# Patient Record
Sex: MALE | Race: WHITE | NOT HISPANIC OR LATINO | URBAN - METROPOLITAN AREA
[De-identification: names, ages, dates, MRNs, and addresses within clinical notes are randomized per-mention and may not be internally consistent; named-entity substitution may affect disease eponyms.]

---

## 2017-02-14 ENCOUNTER — NEW PATIENT (OUTPATIENT)
Dept: URBAN - METROPOLITAN AREA CLINIC 85 | Facility: CLINIC | Age: 77
End: 2017-02-14
Payer: MEDICARE

## 2017-02-14 DIAGNOSIS — T85.22XA DISPLACEMENT OF INTRAOCULAR LENS, INITIAL ENCOUNTER: Primary | ICD-10-CM

## 2017-02-14 DIAGNOSIS — H40.1113 PRIMARY OPEN-ANGLE GLAUCOMA, SEVERE STAGE, RIGHT EYE: ICD-10-CM

## 2017-02-14 PROCEDURE — 76514 ECHO EXAM OF EYE THICKNESS: CPT | Performed by: OPHTHALMOLOGY

## 2017-02-14 PROCEDURE — 92004 COMPRE OPH EXAM NEW PT 1/>: CPT | Performed by: OPHTHALMOLOGY

## 2017-02-14 ASSESSMENT — INTRAOCULAR PRESSURE
OD: 4
OS: 20

## 2017-02-14 ASSESSMENT — VISUAL ACUITY: OS: 20/25

## 2020-09-30 ENCOUNTER — RECORDS - HEALTHEAST (OUTPATIENT)
Dept: ADMINISTRATIVE | Facility: OTHER | Age: 80
End: 2020-09-30

## 2020-09-30 ENCOUNTER — RECORDS - HEALTHEAST (OUTPATIENT)
Dept: LAB | Facility: HOSPITAL | Age: 80
End: 2020-09-30

## 2020-09-30 ENCOUNTER — OFFICE VISIT (OUTPATIENT)
Dept: NEUROLOGY | Facility: CLINIC | Age: 80
End: 2020-09-30
Payer: COMMERCIAL

## 2020-09-30 VITALS
WEIGHT: 142 LBS | HEART RATE: 57 BPM | DIASTOLIC BLOOD PRESSURE: 95 MMHG | HEIGHT: 69 IN | BODY MASS INDEX: 21.03 KG/M2 | SYSTOLIC BLOOD PRESSURE: 145 MMHG

## 2020-09-30 DIAGNOSIS — R41.89 COGNITIVE DECLINE: Primary | ICD-10-CM

## 2020-09-30 DIAGNOSIS — G31.84 MILD COGNITIVE IMPAIRMENT, SO STATED: ICD-10-CM

## 2020-09-30 LAB
TSH SERPL DL<=0.005 MIU/L-ACNC: 0.91 UIU/ML (ref 0.3–5)
VIT B12 SERPL-MCNC: 430 PG/ML (ref 213–816)

## 2020-09-30 PROCEDURE — 99205 OFFICE O/P NEW HI 60 MIN: CPT | Performed by: PSYCHIATRY & NEUROLOGY

## 2020-09-30 RX ORDER — DILTIAZEM HYDROCHLORIDE 120 MG/1
CAPSULE, EXTENDED RELEASE ORAL
COMMUNITY
Start: 2020-06-24

## 2020-09-30 RX ORDER — DORZOLAMIDE HYDROCHLORIDE AND TIMOLOL MALEATE 20; 5 MG/ML; MG/ML
SOLUTION/ DROPS OPHTHALMIC
COMMUNITY
Start: 2020-08-21

## 2020-09-30 RX ORDER — DILTIAZEM HYDROCHLORIDE 120 MG/1
1 CAPSULE, COATED, EXTENDED RELEASE ORAL EVERY 24 HOURS
COMMUNITY
Start: 2020-02-05

## 2020-09-30 RX ORDER — PRAVASTATIN SODIUM 20 MG
20 TABLET ORAL
COMMUNITY
Start: 2020-09-22

## 2020-09-30 ASSESSMENT — MONTREAL COGNITIVE ASSESSMENT (MOCA)
7. [VIGILENCE] TAP WHEN HEARING DESIGNATED LETTER: 1
WHAT LEVEL OF EDUCATION WAS ATTAINED: 1
13. ORIENTATION SUBSCORE: 5
9. REPEAT EACH SENTENCE: 2
11. FOR EACH PAIR OF WORDS, WHAT CATEGORY DO THEY BELONG TO (OUT OF 2): 2
8. SERIAL SUBTRACTION OF 7S: 0
4. NAME EACH OF THE THREE ANIMALS SHOWN: 3
VISUOSPATIAL/EXECUTIVE SUBSCORE: 5
10. [FLUENCY] NAME WORDS STARTING WITH DESIGNATED LETTER: 0
6. READ LIST OF DIGITS [FORWARD/BACKWARD]: 1
12. MEMORY INDEX SCORE: 2
WHAT IS THE TOTAL SCORE (OUT OF 30): 22

## 2020-09-30 ASSESSMENT — MIFFLIN-ST. JEOR: SCORE: 1344.49

## 2020-09-30 NOTE — NURSING NOTE
Chief Complaint   Patient presents with     Dementia     America Briones CMA on 9/30/2020 at 11:21 AM

## 2020-09-30 NOTE — PROGRESS NOTES
NEUROLOGY OUTPATIENT CONSULT NOTE  Sep 30, 2020     CHIEF COMPLAINT/REASON FOR VISIT/REASON FOR CONSULT  Patient presents with:  Dementia    REASON FOR CONSULTATION- Memory concerns    REFERRAL SOURCE  Dr. Carlota Chino  CC Dr. Carlota Chino    HISTORY OF PRESENT ILLNESS  Redd Mcpherson is a 80 year old male seen today for for memory problems.  Patient is accompanied by his wife.  Patient himself does not think he has any memory issues though the wife has been noticing more issues over the last few years.  Symptoms have been progressive.  Symptoms are mainly present once in a while and not every day.  He does not do too many activities during the daytime. He is interested in baseball.  Works on his old car.  Sometimes works in the yard.  Does not like to do chores inside the home.    The wife reports that occasionally he will have difficulty finding words many strength constructive symptoms.  Sometimes he would make up words of his own.  He does not really get lost in the neighborhood though sometimes will get lost in unfamiliar places.  Denies any issues with math.  No difficulty with remembering things do every day parents though he does not do much to start with.    No hallucinations.  No new medications.  Denies depression anxiety or any mood issues.    Previous history is reviewed and this is unchanged.    PAST MEDICAL/SURGICAL HISTORY  Past Medical History:   Diagnosis Date     Hypertension      There is no problem list on file for this patient.  Significant for atrial fibrillation for which he is on Eliquis    FAMILY HISTORY  Family History   Problem Relation Age of Onset     Heart Disease Father    Reviewed and negative for dementia or any memory problems.    SOCIAL HISTORY  Social History     Tobacco Use     Smoking status: Never Smoker     Smokeless tobacco: Never Used   Substance Use Topics     Alcohol use: Yes     Drug use: Never   Social alcohol drinker.  Never been a heavy  "alcoholic.    SYSTEMS REVIEW  Twelve-system ROS was done and other than the HPI this was negative.     MEDICATIONS  apixaban ANTICOAGULANT (ELIQUIS) 5 MG tablet, Take by mouth every 24 hours  DILT- MG 24 hr capsule,   diltiazem ER COATED BEADS (CARTIA XT) 120 MG 24 hr capsule, Take 1 capsule by mouth every 24 hours  dorzolamide-timolol (COSOPT) 2-0.5 % ophthalmic solution,   pravastatin (PRAVACHOL) 20 MG tablet, Take 20 mg by mouth    No current facility-administered medications on file prior to visit.        PHYSICAL EXAMINATION  VITALS: BP (!) 145/95   Pulse 57   Ht 1.753 m (5' 9\")   Wt 64.4 kg (142 lb)   BMI 20.97 kg/m    GENERAL: Healthy appearing, alert, no acute distress, normal habitus.  CARDIOVASCULAR: Extremities warm and well perfused. Pulses present.   EYES: Funduscopic exam does not reveal any obvious papilledema.   NEUROLOGICAL:  Patient is awake and oriented to self, place and time.  Attention span is normal.  MOCA 23.  Language is fluent and follows commands appropriately.  Appropriate fund of knowledge. Cranial nerves 2-12 are intact except pupils are nonreactive due to surgical changes. There is no pronator drift.  Motor exam shows 5/5 strength in all extremities.  Tone is symmetric bilaterally in upper and lower extremities.  Reflexes are symmetric and 2+ in upper extremities and lower extremities. Sensory exam is grossly intact to light touch, pin prick and vibration.  Finger to nose and heel to shin is without dysmetria.  Romberg is negative.  Gait is normal and the patient is able to do tandem walk and walk on toes and heels.      DIAGNOSTICS    OUTSIDE RECORDS  Outside referral notes were reviewed and pertinent information has been summarized:- Patient was seen in primary care on September 22, 2020.  He does have a diagnosis of atrial fibrillation present on Dexilant.  Mood has been good.  PHQ 9 was 0.  Mean call was done on which the patient did not do well.  Patient does not feel " that he has any memory concerns.  Never has difficulty remembering things he needs to get from the stroke.  Patient denied the neurology referral to later the referral was put.    IMPRESSION/REPORT/PLAN  Cognitive decline/subjective memory complaints  Mild cognitive impairment, so stated     -     EEG Routine  -     Vitamin B12  -     Vitamin B1 whole blood  -     TSH with free T4 reflex  -     Methylmalonic Acid  -     MR Brain w/o Contrast  -     NEUROPSYCHOLOGY REFERRAL    This is a 80 year old male with cognitive decline over the years.  Overall I do not suspect he has dementia possibly mild cognitive impairment versus cognitive decline with normal aging the [place] was on the lower end.  More likely to be mild cognitive impairment.  We will complete the work-up to see if any cause for his memory problems has been identified.  We will get an EEG, MRI, blood work.  We will also set him up for neuropsychology evaluation to get a baseline.  He can follow back in the clinic after the testing.    Over 45 minutes were spent coordinating the care for the patient today.  More than 50% of the time was spent counseling, educating the patient.  Billing:- 4 data points, 4 problem points    Jose Kelly MD  Neurologist  [facility]  Tel:- [phone]    This note was dictated using voice recognition software.  Any grammatical or context distortions are unintentional and inherent to the software.

## 2020-09-30 NOTE — LETTER
9/30/2020         RE: Redd Mcpherson  [address] [address]        Dear Colleague,    Thank you for referring your patient, Redd Mcpherson, to the [facility] [place]. Please see a copy of my visit note below.    NEUROLOGY OUTPATIENT CONSULT NOTE  Sep 30, 2020     CHIEF COMPLAINT/REASON FOR VISIT/REASON FOR CONSULT  Patient presents with:  Dementia    REASON FOR CONSULTATION- Memory concerns    REFERRAL SOURCE  Dr. Carlota Chino  CC Dr. Carlota Chino    HISTORY OF PRESENT ILLNESS  Redd Mcpherson is a 80 year old male seen today for for memory problems.  Patient is accompanied by his wife.  Patient himself does not think he has any memory issues though the wife has been noticing more issues over the last few years.  Symptoms have been progressive.  Symptoms are mainly present once in a while and not every day.  He does not do too many activities during the daytime. He is interested in baseball.  Works on his old car.  Sometimes works in the yard.  Does not like to do chores inside the home.    The wife reports that occasionally he will have difficulty finding words many strength constructive symptoms.  Sometimes he would make up words of his own.  He does not really get lost in the neighborhood though sometimes will get lost in unfamiliar places.  Denies any issues with math.  No difficulty with remembering things do every day parents though he does not do much to start with.    No hallucinations.  No new medications.  Denies depression anxiety or any mood issues.    Previous history is reviewed and this is unchanged.    PAST MEDICAL/SURGICAL HISTORY  Past Medical History:   Diagnosis Date     Hypertension      There is no problem list on file for this patient.  Significant for atrial fibrillation for which he is on Eliquis    FAMILY HISTORY  Family History   Problem Relation Age of Onset     Heart Disease Father    Reviewed and negative for dementia or any memory  "problems.    SOCIAL HISTORY  Social History     Tobacco Use     Smoking status: Never Smoker     Smokeless tobacco: Never Used   Substance Use Topics     Alcohol use: Yes     Drug use: Never   Social alcohol drinker.  Never been a heavy alcoholic.    SYSTEMS REVIEW  Twelve-system ROS was done and other than the HPI this was negative.     MEDICATIONS  apixaban ANTICOAGULANT (ELIQUIS) 5 MG tablet, Take by mouth every 24 hours  DILT- MG 24 hr capsule,   diltiazem ER COATED BEADS (CARTIA XT) 120 MG 24 hr capsule, Take 1 capsule by mouth every 24 hours  dorzolamide-timolol (COSOPT) 2-0.5 % ophthalmic solution,   pravastatin (PRAVACHOL) 20 MG tablet, Take 20 mg by mouth    No current facility-administered medications on file prior to visit.        PHYSICAL EXAMINATION  VITALS: BP (!) 145/95   Pulse 57   Ht 1.753 m (5' 9\")   Wt 64.4 kg (142 lb)   BMI 20.97 kg/m    GENERAL: Healthy appearing, alert, no acute distress, normal habitus.  CARDIOVASCULAR: Extremities warm and well perfused. Pulses present.   EYES: Funduscopic exam does not reveal any obvious papilledema.   NEUROLOGICAL:  Patient is awake and oriented to self, place and time.  Attention span is normal.  MOCA 23.  Language is fluent and follows commands appropriately.  Appropriate fund of knowledge. Cranial nerves 2-12 are intact except pupils are nonreactive due to surgical changes. There is no pronator drift.  Motor exam shows 5/5 strength in all extremities.  Tone is symmetric bilaterally in upper and lower extremities.  Reflexes are symmetric and 2+ in upper extremities and lower extremities. Sensory exam is grossly intact to light touch, pin prick and vibration.  Finger to nose and heel to shin is without dysmetria.  Romberg is negative.  Gait is normal and the patient is able to do tandem walk and walk on toes and heels.      DIAGNOSTICS    OUTSIDE RECORDS  Outside referral notes were reviewed and pertinent information has been summarized:- Patient " was seen in primary care on September 22, 2020.  He does have a diagnosis of atrial fibrillation present on Dexilant.  Mood has been good.  PHQ 9 was 0.  Mean call was done on which the patient did not do well.  Patient does not feel that he has any memory concerns.  Never has difficulty remembering things he needs to get from the stroke.  Patient denied the neurology referral to later the referral was put.    IMPRESSION/REPORT/PLAN  Cognitive decline/subjective memory complaints  Mild cognitive impairment, so stated     -     EEG Routine  -     Vitamin B12  -     Vitamin B1 whole blood  -     TSH with free T4 reflex  -     Methylmalonic Acid  -     MR Brain w/o Contrast  -     NEUROPSYCHOLOGY REFERRAL    This is a 80 year old male with cognitive decline over the years.  Overall I do not suspect he has dementia possibly mild cognitive impairment versus cognitive decline with normal aging the [place] was on the lower end.  More likely to be mild cognitive impairment.  We will complete the work-up to see if any cause for his memory problems has been identified.  We will get an EEG, MRI, blood work.  We will also set him up for neuropsychology evaluation to get a baseline.  He can follow back in the clinic after the testing.    Over 45 minutes were spent coordinating the care for the patient today.  More than 50% of the time was spent counseling, educating the patient.  Billing:- 4 data points, 4 problem points    Jose Kelly MD  Neurologist  [facility]  Tel:- [phone]    This note was dictated using voice recognition software.  Any grammatical or context distortions are unintentional and inherent to the software.      Again, thank you for allowing me to participate in the care of your patient.        Sincerely,        Jose Kelly MD

## 2020-10-02 LAB — METHYLMALONATE SERPL-SCNC: 0.2 UMOL/L (ref 0–0.4)

## 2020-10-03 LAB — VIT B1 PYROPHOSHATE BLD-SCNC: 122 NMOL/L (ref 70–180)

## 2020-10-05 ENCOUNTER — TELEPHONE (OUTPATIENT)
Dept: NEUROLOGY | Facility: CLINIC | Age: 80
End: 2020-10-05

## 2020-10-05 NOTE — TELEPHONE ENCOUNTER
Pt's wife lm that MRI order still not received. And appt with Bart Gray for neuropsy pending to see if he takes their ins. Please call to help get these things schedued.[phone]

## 2020-10-06 ENCOUNTER — RECORDS - HEALTHEAST (OUTPATIENT)
Dept: SCHEDULING | Facility: CLINIC | Age: 80
End: 2020-10-06

## 2020-10-06 ENCOUNTER — RECORDS - HEALTHEAST (OUTPATIENT)
Dept: ADMINISTRATIVE | Facility: OTHER | Age: 80
End: 2020-10-06

## 2020-10-06 DIAGNOSIS — R41.89 COGNITIVE DECLINE: ICD-10-CM

## 2020-10-06 NOTE — TELEPHONE ENCOUNTER
Spoke with spouse and will resend the order for MRI. They have not heard yet from Dr. Gray team for scheduling but reiterated that the referral was sent and they should get a call in the coming days for that   Blake Serrano CMA on 10/6/2020 at 11:17 AM

## 2020-10-12 ENCOUNTER — AMBULATORY - HEALTHEAST (OUTPATIENT)
Dept: NEUROLOGY | Facility: CLINIC | Age: 80
End: 2020-10-12

## 2020-10-12 DIAGNOSIS — R41.89 COGNITIVE DECLINE: ICD-10-CM

## 2020-10-13 ENCOUNTER — AMBULATORY - HEALTHEAST (OUTPATIENT)
Dept: NEUROLOGY | Facility: CLINIC | Age: 80
End: 2020-10-13

## 2020-10-22 ENCOUNTER — TELEPHONE (OUTPATIENT)
Dept: NEUROLOGY | Facility: CLINIC | Age: 80
End: 2020-10-22

## 2020-10-22 NOTE — TELEPHONE ENCOUNTER
Neuropsychology Note     Mr. Mcpherson was originally referred for a neuropsychological evaluation on October 12th by his neurologist, Dr. Kelly from [facility]. Due to the ongoing COVID-19 pandemic that limits current in-person visits, we scheduled Mr. Mcpherson for a Tele-Health visit for his neuropsychological evaluation on October 20th. But his wife later called and cancelled saying that there is too much going on right now and she'll call back when she is ready to re-schedule. For now we will defer the order and place him on our in person wait list. But we would be happy to see him for a Tele-Health appointment if they decide they want to move forward sooner.      Mr. Mcpherson or his family is welcome to contact our service at any time ([phone]) should he wish to schedule this evaluation.

## 2020-11-06 ENCOUNTER — AMBULATORY - HEALTHEAST (OUTPATIENT)
Dept: BEHAVIORAL HEALTH | Facility: CLINIC | Age: 80
End: 2020-11-06

## 2020-11-06 DIAGNOSIS — R41.89 COGNITIVE DECLINE: ICD-10-CM

## 2021-06-12 NOTE — PROGRESS NOTES
Neuropsychology Note    Mr. Mcpherson was originally referred for a neuropsychological evaluation on October 12th by his neurologist, Dr. Kelly from [facility]. Due to the ongoing COVID-19 pandemic that limits current in-person visits, we scheduled Mr. Mcpherson for a Tele-Health visit for his neuropsychological evaluation on October 20th. But his wife later called and cancelled saying that there is too much going on right now and she'll call back when she is ready to re-schedule. For now we will defer the order and place him on our in person wait list. But we would be happy to see him for a Tele-Health appointment if they decide they want to move forward sooner.     Mr. Mcpherson or his family is welcome to contact our service at any time ([phone]) should he wish to schedule this evaluation.

## 2021-09-13 ENCOUNTER — DOCUMENTATION ONLY (OUTPATIENT)
Dept: NEUROLOGY | Facility: CLINIC | Age: 81
End: 2021-09-13

## 2021-09-13 NOTE — PROGRESS NOTES
Neuropsychology Note    Mr. Mcpherson was originally referred for a neuropsychological evaluation in October 2020 by his neurologist, Dr. Kelly from [facility]. Due to the ongoing COVID-19 pandemic that limited in-person visits, we scheduled Mr. Mcpherson for a Tele-Health visit for his neuropsychological evaluation on October 20th. But his wife later called and cancelled saying that there was too much going on at the time and that she would call back when they were ready to re-schedule. We never heard back and so our service reached out to see if they were still interested. They shared that the appointment was no longer needed and thus the order will again be deferred.     Mr. Mcpherson is welcome to contact our service at any time ([phone]) should he wish to re-schedule this evaluation.    Linda Hilton, PhD, LP, ABPP  Clinical Neuropsychologist  [facility]